# Patient Record
Sex: FEMALE | Race: WHITE | NOT HISPANIC OR LATINO | URBAN - METROPOLITAN AREA
[De-identification: names, ages, dates, MRNs, and addresses within clinical notes are randomized per-mention and may not be internally consistent; named-entity substitution may affect disease eponyms.]

---

## 2023-08-23 ENCOUNTER — INPATIENT (INPATIENT)
Facility: HOSPITAL | Age: 23
LOS: 1 days | Discharge: HOME | DRG: 645 | End: 2023-08-25
Attending: SPECIALIST | Admitting: SPECIALIST
Payer: COMMERCIAL

## 2023-08-23 VITALS
SYSTOLIC BLOOD PRESSURE: 123 MMHG | HEIGHT: 62 IN | RESPIRATION RATE: 18 BRPM | DIASTOLIC BLOOD PRESSURE: 79 MMHG | WEIGHT: 125 LBS | TEMPERATURE: 98 F | HEART RATE: 105 BPM | OXYGEN SATURATION: 95 %

## 2023-08-23 LAB
ANION GAP SERPL CALC-SCNC: 9 MMOL/L — SIGNIFICANT CHANGE UP (ref 5–17)
BASOPHILS # BLD AUTO: 0.06 K/UL — SIGNIFICANT CHANGE UP (ref 0–0.2)
BASOPHILS NFR BLD AUTO: 0.6 % — SIGNIFICANT CHANGE UP (ref 0–2)
BUN SERPL-MCNC: 5 MG/DL — LOW (ref 7–23)
CALCIUM SERPL-MCNC: 10 MG/DL — SIGNIFICANT CHANGE UP (ref 8.4–10.5)
CHLORIDE SERPL-SCNC: 101 MMOL/L — SIGNIFICANT CHANGE UP (ref 96–108)
CO2 SERPL-SCNC: 29 MMOL/L — SIGNIFICANT CHANGE UP (ref 22–31)
CREAT SERPL-MCNC: 0.61 MG/DL — SIGNIFICANT CHANGE UP (ref 0.5–1.3)
EGFR: 131 ML/MIN/1.73M2 — SIGNIFICANT CHANGE UP
EOSINOPHIL # BLD AUTO: 0.09 K/UL — SIGNIFICANT CHANGE UP (ref 0–0.5)
EOSINOPHIL NFR BLD AUTO: 1 % — SIGNIFICANT CHANGE UP (ref 0–6)
GLUCOSE SERPL-MCNC: 103 MG/DL — HIGH (ref 70–99)
HCG SERPL-ACNC: <0 MIU/ML — SIGNIFICANT CHANGE UP
HCT VFR BLD CALC: 36 % — SIGNIFICANT CHANGE UP (ref 34.5–45)
HGB BLD-MCNC: 11.8 G/DL — SIGNIFICANT CHANGE UP (ref 11.5–15.5)
IMM GRANULOCYTES NFR BLD AUTO: 0.3 % — SIGNIFICANT CHANGE UP (ref 0–0.9)
LYMPHOCYTES # BLD AUTO: 2.57 K/UL — SIGNIFICANT CHANGE UP (ref 1–3.3)
LYMPHOCYTES # BLD AUTO: 27.2 % — SIGNIFICANT CHANGE UP (ref 13–44)
MCHC RBC-ENTMCNC: 29 PG — SIGNIFICANT CHANGE UP (ref 27–34)
MCHC RBC-ENTMCNC: 32.8 GM/DL — SIGNIFICANT CHANGE UP (ref 32–36)
MCV RBC AUTO: 88.5 FL — SIGNIFICANT CHANGE UP (ref 80–100)
MONOCYTES # BLD AUTO: 0.52 K/UL — SIGNIFICANT CHANGE UP (ref 0–0.9)
MONOCYTES NFR BLD AUTO: 5.5 % — SIGNIFICANT CHANGE UP (ref 2–14)
NEUTROPHILS # BLD AUTO: 6.19 K/UL — SIGNIFICANT CHANGE UP (ref 1.8–7.4)
NEUTROPHILS NFR BLD AUTO: 65.4 % — SIGNIFICANT CHANGE UP (ref 43–77)
NRBC # BLD: 0 /100 WBCS — SIGNIFICANT CHANGE UP (ref 0–0)
PLATELET # BLD AUTO: 345 K/UL — SIGNIFICANT CHANGE UP (ref 150–400)
POTASSIUM SERPL-MCNC: 3.8 MMOL/L — SIGNIFICANT CHANGE UP (ref 3.5–5.3)
POTASSIUM SERPL-SCNC: 3.8 MMOL/L — SIGNIFICANT CHANGE UP (ref 3.5–5.3)
RBC # BLD: 4.07 M/UL — SIGNIFICANT CHANGE UP (ref 3.8–5.2)
RBC # FLD: 12.5 % — SIGNIFICANT CHANGE UP (ref 10.3–14.5)
SODIUM SERPL-SCNC: 139 MMOL/L — SIGNIFICANT CHANGE UP (ref 135–145)
WBC # BLD: 9.46 K/UL — SIGNIFICANT CHANGE UP (ref 3.8–10.5)
WBC # FLD AUTO: 9.46 K/UL — SIGNIFICANT CHANGE UP (ref 3.8–10.5)

## 2023-08-23 PROCEDURE — 99285 EMERGENCY DEPT VISIT HI MDM: CPT

## 2023-08-23 PROCEDURE — 70491 CT SOFT TISSUE NECK W/DYE: CPT | Mod: 26,MA

## 2023-08-23 RX ORDER — ONDANSETRON 8 MG/1
4 TABLET, FILM COATED ORAL EVERY 4 HOURS
Refills: 0 | Status: DISCONTINUED | OUTPATIENT
Start: 2023-08-23 | End: 2023-08-25

## 2023-08-23 RX ORDER — AMPICILLIN SODIUM AND SULBACTAM SODIUM 250; 125 MG/ML; MG/ML
INJECTION, POWDER, FOR SUSPENSION INTRAMUSCULAR; INTRAVENOUS
Refills: 0 | Status: DISCONTINUED | OUTPATIENT
Start: 2023-08-23 | End: 2023-08-25

## 2023-08-23 RX ORDER — KETOROLAC TROMETHAMINE 30 MG/ML
15 SYRINGE (ML) INJECTION ONCE
Refills: 0 | Status: DISCONTINUED | OUTPATIENT
Start: 2023-08-23 | End: 2023-08-23

## 2023-08-23 RX ORDER — SERTRALINE 25 MG/1
125 TABLET, FILM COATED ORAL DAILY
Refills: 0 | Status: DISCONTINUED | OUTPATIENT
Start: 2023-08-23 | End: 2023-08-25

## 2023-08-23 RX ORDER — SODIUM CHLORIDE 9 MG/ML
1000 INJECTION, SOLUTION INTRAVENOUS
Refills: 0 | Status: DISCONTINUED | OUTPATIENT
Start: 2023-08-24 | End: 2023-08-24

## 2023-08-23 RX ORDER — AMPICILLIN SODIUM AND SULBACTAM SODIUM 250; 125 MG/ML; MG/ML
3 INJECTION, POWDER, FOR SUSPENSION INTRAMUSCULAR; INTRAVENOUS ONCE
Refills: 0 | Status: COMPLETED | OUTPATIENT
Start: 2023-08-23 | End: 2023-08-23

## 2023-08-23 RX ORDER — OXYCODONE HYDROCHLORIDE 5 MG/1
5 TABLET ORAL EVERY 4 HOURS
Refills: 0 | Status: DISCONTINUED | OUTPATIENT
Start: 2023-08-23 | End: 2023-08-25

## 2023-08-23 RX ORDER — IBUPROFEN 200 MG
400 TABLET ORAL EVERY 6 HOURS
Refills: 0 | Status: DISCONTINUED | OUTPATIENT
Start: 2023-08-23 | End: 2023-08-25

## 2023-08-23 RX ORDER — AMPICILLIN SODIUM AND SULBACTAM SODIUM 250; 125 MG/ML; MG/ML
3 INJECTION, POWDER, FOR SUSPENSION INTRAMUSCULAR; INTRAVENOUS EVERY 6 HOURS
Refills: 0 | Status: DISCONTINUED | OUTPATIENT
Start: 2023-08-24 | End: 2023-08-25

## 2023-08-23 RX ORDER — PANTOPRAZOLE SODIUM 20 MG/1
40 TABLET, DELAYED RELEASE ORAL DAILY
Refills: 0 | Status: DISCONTINUED | OUTPATIENT
Start: 2023-08-23 | End: 2023-08-25

## 2023-08-23 RX ORDER — ACETAMINOPHEN 500 MG
650 TABLET ORAL EVERY 6 HOURS
Refills: 0 | Status: DISCONTINUED | OUTPATIENT
Start: 2023-08-23 | End: 2023-08-25

## 2023-08-23 RX ORDER — LANOLIN ALCOHOL/MO/W.PET/CERES
3 CREAM (GRAM) TOPICAL AT BEDTIME
Refills: 0 | Status: DISCONTINUED | OUTPATIENT
Start: 2023-08-23 | End: 2023-08-25

## 2023-08-23 RX ORDER — SODIUM CHLORIDE 9 MG/ML
1000 INJECTION INTRAMUSCULAR; INTRAVENOUS; SUBCUTANEOUS ONCE
Refills: 0 | Status: COMPLETED | OUTPATIENT
Start: 2023-08-23 | End: 2023-08-23

## 2023-08-23 RX ORDER — HEPARIN SODIUM 5000 [USP'U]/ML
5000 INJECTION INTRAVENOUS; SUBCUTANEOUS ONCE
Refills: 0 | Status: COMPLETED | OUTPATIENT
Start: 2023-08-23 | End: 2023-08-23

## 2023-08-23 RX ADMIN — HEPARIN SODIUM 5000 UNIT(S): 5000 INJECTION INTRAVENOUS; SUBCUTANEOUS at 20:57

## 2023-08-23 RX ADMIN — Medication 15 MILLIGRAM(S): at 15:57

## 2023-08-23 RX ADMIN — Medication 400 MILLIGRAM(S): at 21:58

## 2023-08-23 RX ADMIN — SODIUM CHLORIDE 1000 MILLILITER(S): 9 INJECTION INTRAMUSCULAR; INTRAVENOUS; SUBCUTANEOUS at 18:56

## 2023-08-23 RX ADMIN — Medication 15 MILLIGRAM(S): at 18:53

## 2023-08-23 RX ADMIN — AMPICILLIN SODIUM AND SULBACTAM SODIUM 200 GRAM(S): 250; 125 INJECTION, POWDER, FOR SUSPENSION INTRAMUSCULAR; INTRAVENOUS at 20:59

## 2023-08-23 RX ADMIN — SODIUM CHLORIDE 1000 MILLILITER(S): 9 INJECTION INTRAMUSCULAR; INTRAVENOUS; SUBCUTANEOUS at 15:57

## 2023-08-23 RX ADMIN — SERTRALINE 125 MILLIGRAM(S): 25 TABLET, FILM COATED ORAL at 21:54

## 2023-08-23 RX ADMIN — Medication 400 MILLIGRAM(S): at 20:58

## 2023-08-23 NOTE — ED PROVIDER NOTE - TOBACCO USE
Scheduled pre op on 2/18/2020    ----- Message from Claudia Akbar sent at 2/14/2020 12:10 PM CST -----  Contact: Chari   Chari would like a call back about a pre op visit. She said the nurse schedules this for her.      Unknown if ever smoked

## 2023-08-23 NOTE — ED ADULT TRIAGE NOTE - CHIEF COMPLAINT QUOTE
Pt presents to ED c/o throat pain x 4 days. Sent in ENT for IV antibiotics. Pain worse with swallowing.

## 2023-08-23 NOTE — CHART NOTE - NSCHARTNOTEFT_GEN_A_CORE
CT neck with contrast reviewed with evidence of abscess versus persistent thyroglossal duct cyst that in conjunction with clinical presentation indicates infection.     Plan:  - Admit to Dr. Bland  - ID consult  - IR consult for possible US guided needle aspiration  - Pain control  - Clear liquid diet for now then NPO midnight  - SCDs  - Incentive spirometer  - Heparin subQ x1 dose

## 2023-08-23 NOTE — H&P ADULT - NSHPPHYSICALEXAM_GEN_ALL_CORE
General: NAD, A+Ox3  Respiratory: No respiratory distress, stridor, or stertor  Voice quality: normal  Face:  Symmetric, facial movements intact.   OU: EOMI  AD: Pinna wnl  AS: Pinna wnl  Nose: nasal cavity clear bilaterally, inferior turbinates normal, mucosa normal without crusting or bleeding  OC/OP: tongue normal, floor of mouth WNL, no masses or lesions, OP clear  Neck: Small ~1.5 cm area of erythema with tenderness to submental region just above the midline of hyoid on the right side. No significant induration, mild swelling.   Neuro: CNII-XII intact

## 2023-08-23 NOTE — ED PROVIDER NOTE - OBJECTIVE STATEMENT
21 yo f with pmh of a thyroigloassal duct cyst removed in Feb c/b multiple infections c/o R sided sore throat/neck pain x 4 days. Pt started augmentin 3 days ago. Last night pain was worse so she went to the ED at [place] and was give a dose of IV abx. Pt followed up today with Dr. Jefferson and was advised to come to the ED. Pt states she is having a lot of pain with swallowing. Denies fever, chills, drooling, sob. 23 yo f with pmh of a thyroglossal duct cyst removed in Feb c/b multiple infections c/o R sided sore throat/neck pain x 4 days. Pt started augmentin 3 days ago. Last night pain was worse so she went to the ED at [place] and was give a dose of IV abx. Pt followed up today with Dr. Jefferson and was advised to come to the ED. Pt states she is having a lot of pain with swallowing. Denies fever, chills, drooling, sob.

## 2023-08-23 NOTE — H&P ADULT - ASSESSMENT
LUCIANO TAYLOR is a 22y Female with prior TGD excision in February 2023, with 2-3 episodes of recurrent symptoms including swelling and tenderness under chin that resolve with antibiotics, who was referred for evaluation from ENT office today after presenting with 3 days of swelling and tenderness to submental region in area of prior cyst excison. No significant swelling appreciated although some fullness/induration is present. Patient refusing scope exam due to discomfort with prior exams; understands risks of doing so including possible failure to diagnose pathology accurately and rule out any infection impacting airway. Tolerating diet; no voice changes or difficulty breathing. Afebrile with no leukocytosis.     - Admit to Dr. Bland  - Continuous pulse oximetry  - ID consult  - IR consult for possible US guided needle aspiration  - Pain control  - Clear liquid diet for now then NPO midnight  - SCDs  - Incentive spirometer  - Heparin subQ x1 dose LUCIANO TAYLOR is a 22y Female with prior TGD excision in February 2023, with 2-3 episodes of recurrent symptoms including swelling and tenderness under chin that resolve with antibiotics, who was referred for evaluation from ENT office today after presenting with 3 days of swelling and tenderness to submental region in area of prior cyst excison. No significant swelling appreciated although some fullness/induration is present. Patient refusing scope exam due to discomfort with prior exams; understands risks of doing so including possible failure to diagnose pathology accurately and rule out any infection impacting airway. Tolerating diet; no voice changes or difficulty breathing. Afebrile with no leukocytosis. CT scan demonstrating persistent thyroglossal duct cyst vs submental abscess that is <1cm.     - Admit to regional under Dr. Bland  - Continuous pulse oximetry  - Unasyn for now  - ID consult  - IR consult for possible US guided needle aspiration  - Pain control  - Clear liquid diet for now then NPO/IVF at midnight  - SCDs  - Incentive spirometer  - Heparin subQ x1 dose    Discussed with attending, Dr. Bland

## 2023-08-23 NOTE — ED PROVIDER NOTE - CLINICAL SUMMARY MEDICAL DECISION MAKING FREE TEXT BOX
21 yo f with pmh of a thyroigloassal duct cyst removed in Feb c/b multiple infections c/o R sided sore throat/neck pain x 4 days. Pt started augmentin 3 days ago. Last night pain was worse so she went to the ED at [place] and was give a dose of IV abx. Pt followed up today with Dr. Jefferson and was advised to come to the ED. Pt states she is having a lot of pain with swallowing. Denies fever, chills, drooling, sob. + circular indurated R sided submandibular swelling, erythema and tenderness 23 yo f with pmh of a thyroglossal duct cyst removed in Feb c/b multiple infections c/o R sided sore throat/neck pain x 4 days. Pt started augmentin 3 days ago. Last night pain was worse so she went to the ED at [place] and was give a dose of IV abx. Pt followed up today with Dr. Jefferson and was advised to come to the ED. Pt states she is having a lot of pain with swallowing. Denies fever, chills, drooling, sob. + circular indurated R sided submandibular swelling, erythema and tenderness 23 yo f with pmh of a thyroglossal duct cyst removed in Feb c/b multiple infections c/o R sided sore throat/neck pain x 4 days. Pt started augmentin 3 days ago. Last night pain was worse so she went to the ED at [place] and was give a dose of IV abx. Pt followed up today with Dr. Jefferson and was advised to come to the ED. Pt states she is having a lot of pain with swallowing. Denies fever, chills, drooling, sob. + circular indurated R sided submandibular swelling, erythema and tenderness. labs, ct neck, ent eval

## 2023-08-23 NOTE — ED ADULT NURSE NOTE - NSFALLUNIVINTERV_ED_ALL_ED
Bed/Stretcher in lowest position, wheels locked, appropriate side rails in place/Call bell, personal items and telephone in reach/Instruct patient to call for assistance before getting out of bed/chair/stretcher/Non-slip footwear applied when patient is off stretcher/[place] to call system/Physically safe environment - no spills, clutter or unnecessary equipment/Purposeful proactive rounding/Room/bathroom lighting operational, light cord in reach

## 2023-08-23 NOTE — ED PROVIDER NOTE - PROGRESS NOTE DETAILS
CT shows ?seroma vs infected seroma vs thyroductal cyst infection. ENT recommending admission for potential IR drainage tomorrow

## 2023-08-23 NOTE — ED PROVIDER NOTE - ATTENDING APP SHARED VISIT CONTRIBUTION OF CARE
Mild tachycardia, other vitals wnl. Exam as above.   Labs grossly wnl.   CT showed "Presumed phlegmonous changes in the right submental region as described above with small pocket of fluid superficial to the infrahyoid strap muscle. Question of seroma versus infected seroma or residual thyroglossal duct cyst."  ENT referred pt to ED --> ENT consulted, will admit for further care.   Given IVF, toradol.

## 2023-08-23 NOTE — ED ADULT NURSE NOTE - OBJECTIVE STATEMENT
pt received into spot B A&Ox4 ambulatory appears comfortable arrives via walk in triage for eval of sore throat pain with swallowing x 4 days. Pt speaks clear full sentences tolerates secretions has hx of similar issue throat infections. Went to other ER had dose of IV abx last night still having pain with swallowing went to MD's office who reffered to ED today. Pt respirations unlabored abd nondistended no distress noted. IV placed labs sent medicated per orders pendign ENT eval in nad family at bedside

## 2023-08-23 NOTE — H&P ADULT - HISTORY OF PRESENT ILLNESS
LUCIANO TAYLOR  is a 22y Female with history of prior thyroglossal duct cyst excision by ENT at [facility] 02/23 who presents today from ENT office for evaluation of swelling and tenderness under her chin that developed 3 days ago as well as discomfort with swallowing. Patient was started on Augmentin for possible infected cyst 3 days ago by PCP. Reports that she feels as if  the swelling and tenderness that developed under the chin is about the same since starting antibioitics. Has been taking tylenol and ibuprofen at home with intermittent improvement in pain but continues to recur. Has been tolerating diet including solids and liquids with some discomfort. No choking or coughing with eating. Denies any voice change or difficulty breathing. Reports this is the third episode of similar symptoms since the surgery to remove the cyst and she had an appointment with Dr. Bland today for a second opinion/reevaluation.

## 2023-08-23 NOTE — CONSULT NOTE ADULT - SUBJECTIVE AND OBJECTIVE BOX
OTOLARYNGOLOGY (ENT) CONSULTATION NOTE    PATIENT: LUCIANO TAYLOR     MRN: [id number]       : [birth year]  DATE OF ADMISSION:23  DATE OF SERVICE:  23 @ 16:29    CHIEF COMPLAINT: neck swelling for 3 days    HISTORY OF PRESENT ILLNESS:  LUCIANO TAYLOR  is a 22y Female with history of prior thyroglossal duct cyst excision by ENT at [facility]  who presents today from ENT office for evaluation of swelling and tenderness under her chin that developed 3 days ago as well as discomfort with swallowing. Patient was started on Augmentin for possible infected cyst 3 days ago by PCP. Reports that she feels as if  the swelling and tenderness that developed under the chin is about the same since starting antibioitics. Has been taking tylenol and ibuprofen at home with intermittent improvement in pain but continues to recur. Has been tolerating diet including solids and liquids with some discomfort. No choking or coughing with eating. Denies any voice change or difficulty breathing. Reports this is the third episode of similar symptoms since the surgery to remove the cyst and she had an appointment with Dr. Bland today for a second opinion/reevaluation.     PAST MEDICAL HISTORY:    CURRENT MEDICATIONS       HOME MEDICATIONS:      ALLERGIES:  No Known Allergies    SOCIAL HISTORY: Pertinent included in HPI   FAMILY HISTORY      SURGICAL HISTORY:      REVIEW OF SYSTEMS: The patient was asked and responded to a review of systems regarding the following symptoms: constitutional, eye, ears, nose, mouth, throat, cardiovascular, respiratory. Pertinent factors have been included in the HPI.     PHYSICAL EXAMINATION:  General: NAD, A+Ox3  Respiratory: No respiratory distress, stridor, or stertor  Voice quality: normal  Face:  Symmetric.   OU: EOMI  AD: Pinna wnl  AS: Pinna wnl  Nose: nasal cavity clear bilaterally, inferior turbinates normal, mucosa normal without crusting or bleeding  OC/OP: tongue normal, floor of mouth WNL, no masses or lesions, OP clear  Neck: soft/flat, no LAD  Neuro: CNII-XII intact    Vital Signs:  T(C): 36.9 (23 @ 15:23), Max: 36.9 (23 @ 15:23)  HR: 105 (23 @ 15:23) (105 - 105)  BP: 123/79 (23 @ 15:23) (123/79 - 123/)  RR: 18 (23 @ 15:23) (18 - 18)  SpO2: 95% (23 @ 15:23) (95% - 95%)                        11.8   9.46  )-----------( 345      ( 23 Aug 2023 16:04 )             36.0        139  |  101  |  x   ----------------------------<  103<H>  3.8   |  29  |  0.61    Ca    10.0      23 Aug 2023 16:04     [id number]    MICROBIOLOGY:      PATHOLOGY:                -------------------------------  ASSESSMENT/PLAN:    IMPRESSION: LUCIANO TAYLOR  is a 22y Female with     RECOMMENDATIONS:    ---  Thank you for the kind referral and for allowing me to share in the care of LUCIANO TAYLOR If you have any questions, please do not hesitate to contact me.     Sincerely,  Jenae Mckee PA-C  23 @ 16:29     OTOLARYNGOLOGY (ENT) CONSULTATION NOTE    PATIENT: LUCIANO TAYLOR     MRN: [id number]       : [birth year]  DATE OF ADMISSION:23  DATE OF SERVICE:  23 @ 16:29    CHIEF COMPLAINT: neck swelling for 3 days    HISTORY OF PRESENT ILLNESS:  LUCIANO TAYLOR  is a 22y Female with history of prior thyroglossal duct cyst excision by ENT at [facility]  who presents today from ENT office for evaluation of swelling and tenderness under her chin that developed 3 days ago as well as discomfort with swallowing. Patient was started on Augmentin for possible infected cyst 3 days ago by PCP. Reports that she feels as if  the swelling and tenderness that developed under the chin is about the same since starting antibioitics. Has been taking tylenol and ibuprofen at home with intermittent improvement in pain but continues to recur. Has been tolerating diet including solids and liquids with some discomfort. No choking or coughing with eating. Denies any voice change or difficulty breathing. Reports this is the third episode of similar symptoms since the surgery to remove the cyst and she had an appointment with Dr. Bland today for a second opinion/reevaluation.     PAST MEDICAL HISTORY:    CURRENT MEDICATIONS       HOME MEDICATIONS:      ALLERGIES:  No Known Allergies    SOCIAL HISTORY: Pertinent included in HPI   FAMILY HISTORY      SURGICAL HISTORY:      REVIEW OF SYSTEMS: The patient was asked and responded to a review of systems regarding the following symptoms: constitutional, eye, ears, nose, mouth, throat, cardiovascular, respiratory. Pertinent factors have been included in the HPI.     PHYSICAL EXAMINATION:  General: NAD, A+Ox3  Respiratory: No respiratory distress, stridor, or stertor  Voice quality: normal  Face:  Symmetric, facial movements intact.   OU: EOMI  AD: Pinna wnl  AS: Pinna wnl  Nose: nasal cavity clear bilaterally, inferior turbinates normal, mucosa normal without crusting or bleeding  OC/OP: tongue normal, floor of mouth WNL, no masses or lesions, OP clear  Neck: Small ~1.5 cm area of erythema with tenderness to submental region just above the midline of hyoid on the right side. No significant induration, mild swelling.   Neuro: CNII-XII intact    Vital Signs:  T(C): 36.9 (23 @ 15:23), Max: 36.9 (23 @ 15:23)  HR: 105 (23 @ 15:23) (105 - 105)  BP: 123/79 (23 @ 15:23) (123/79 - 123/79)  RR: 18 (23 @ 15:23) (18 - 18)  SpO2: 95% (23 @ 15:23) (95% - 95%)                        11.8   9.46  )-----------( 345      ( 23 Aug 2023 16:04 )             36.0        139  |  101  |  x   ----------------------------<  103<H>  3.8   |  29  |  0.61    Ca    10.0      23 Aug 2023 16:04     [id number]

## 2023-08-23 NOTE — ED PROVIDER NOTE - PHYSICAL EXAMINATION
CONSTITUTIONAL: Well-appearing;  in no apparent distress.   HEAD: Normocephalic; atraumatic.   EYES: PERRL; EOM intact; conjunctiva and sclera clear  ENT: normal nose; no rhinorrhea; normal pharynx with no erythema or lesions.   NECK: Supple; + circular indurated R sided submandibular swelling, erythema and tenderness   CARDIOVASCULAR: rrr,  RESPIRATORY: Breathing easily;   MSK: FROM at all extremities, normal tone   EXT: No cyanosis or edema; N/V intact  SKIN: Normal for age and race; warm; dry; good turgor; no apparent lesions or rash.

## 2023-08-24 LAB
APTT BLD: 35.3 SEC — SIGNIFICANT CHANGE UP (ref 24.5–35.6)
GRAM STN FLD: SIGNIFICANT CHANGE UP
HCG UR QL: NEGATIVE — SIGNIFICANT CHANGE UP
INR BLD: 1.05 — SIGNIFICANT CHANGE UP (ref 0.85–1.18)
PROTHROM AB SERPL-ACNC: 11.9 SEC — SIGNIFICANT CHANGE UP (ref 9.5–13)
SPECIMEN SOURCE: SIGNIFICANT CHANGE UP

## 2023-08-24 PROCEDURE — 10160 PNXR ASPIR ABSC HMTMA BULLA: CPT

## 2023-08-24 PROCEDURE — 76942 ECHO GUIDE FOR BIOPSY: CPT | Mod: 26

## 2023-08-24 PROCEDURE — 99222 1ST HOSP IP/OBS MODERATE 55: CPT

## 2023-08-24 RX ORDER — BENZOCAINE AND MENTHOL 5; 1 G/100ML; G/100ML
1 LIQUID ORAL
Refills: 0 | Status: DISCONTINUED | OUTPATIENT
Start: 2023-08-24 | End: 2023-08-25

## 2023-08-24 RX ORDER — ACETAMINOPHEN 500 MG
1000 TABLET ORAL ONCE
Refills: 0 | Status: COMPLETED | OUTPATIENT
Start: 2023-08-24 | End: 2023-08-24

## 2023-08-24 RX ORDER — ACETAMINOPHEN 500 MG
1000 TABLET ORAL ONCE
Refills: 0 | Status: DISCONTINUED | OUTPATIENT
Start: 2023-08-24 | End: 2023-08-25

## 2023-08-24 RX ADMIN — AMPICILLIN SODIUM AND SULBACTAM SODIUM 200 GRAM(S): 250; 125 INJECTION, POWDER, FOR SUSPENSION INTRAMUSCULAR; INTRAVENOUS at 16:38

## 2023-08-24 RX ADMIN — SODIUM CHLORIDE 75 MILLILITER(S): 9 INJECTION, SOLUTION INTRAVENOUS at 00:15

## 2023-08-24 RX ADMIN — AMPICILLIN SODIUM AND SULBACTAM SODIUM 200 GRAM(S): 250; 125 INJECTION, POWDER, FOR SUSPENSION INTRAMUSCULAR; INTRAVENOUS at 10:03

## 2023-08-24 RX ADMIN — AMPICILLIN SODIUM AND SULBACTAM SODIUM 200 GRAM(S): 250; 125 INJECTION, POWDER, FOR SUSPENSION INTRAMUSCULAR; INTRAVENOUS at 21:19

## 2023-08-24 RX ADMIN — Medication 400 MILLIGRAM(S): at 04:15

## 2023-08-24 RX ADMIN — AMPICILLIN SODIUM AND SULBACTAM SODIUM 200 GRAM(S): 250; 125 INJECTION, POWDER, FOR SUSPENSION INTRAMUSCULAR; INTRAVENOUS at 03:01

## 2023-08-24 RX ADMIN — Medication 400 MILLIGRAM(S): at 03:15

## 2023-08-24 RX ADMIN — SERTRALINE 125 MILLIGRAM(S): 25 TABLET, FILM COATED ORAL at 21:38

## 2023-08-24 RX ADMIN — Medication 650 MILLIGRAM(S): at 16:39

## 2023-08-24 NOTE — CONSULT NOTE ADULT - ASSESSMENT
#Thyroglossal duct cyst    Amanda Garcia is a 21 yo woman with PMHx of thyroglossal duct cyst excision (02/23) who presents with thyroglossal duct vs seroma infection. Her symptoms did not improve on oral Augmentin but are now improving while inpatient on IV Unasyn. Afebrile on admission without leukocytosis. CT consistent with infected seroma vs. residual thyroglossal duct cyst. She is now awaiting IR consultation for drainage potential.       Recommendations:  - Continue Unasyn IV 3g q6hr  - If drainable by IR, send cultures      ID Team 1 will continue to follow
ASSESSMENT/PLAN:    IMPRESSION: LUCIANO TAYLOR is a 22y Female with prior TGD excision in February 2023, with 2-3 episodes of recurrent symptoms including swelling and tenderness under chin that resolve with antibiotics, who was referred for evaluation from ENT office today after presenting with 3 days of swelling and tenderness to submental region in area of prior cyst excison. No significant swelling appreciated although some fullness/induration is present. Patient refusing scope exam due to discomfort with prior exams; understands risks of doing so including possible failure to diagnose pathology accurately and rule out any infection impacting airway. Tolerating diet; no voice changes or difficulty breathing. Afebrile with no leukocytosis.     RECOMMENDATIONS:  - CT neck with IV contrast  - Consider changing antibiotics to clindamycin given lack of improvement on Augmentin  - Follow up with Dr. Bland after resolution of acute infection    Thank you for the kind referral and for allowing me to share in the care of LUCIANO TAYLOR If you have any questions, please do not hesitate to contact me.     Sincerely,  Joselito Godinez MD PGY2  08-23-23 @ 16:29
Assessment: 22F with history of prior thyroglossal duct cyst excision by ENT at [facility] 02/23 who presents from ENT office for evaluation of swelling and tenderness under her chin x 3 days as well as discomfort with swallowing. Reports this is the third episode of similar symptoms since the surgery to remove the cyst. ENT following for possible seroma vs abscess vs cyst remnant. IR consulted for aspiration of collection. Case reviewed with Dr. Rosado, plan for procedure with local anesthesia.     Communicated with: Jenae SPENCER

## 2023-08-24 NOTE — CONSULT NOTE ADULT - SUBJECTIVE AND OBJECTIVE BOX
22F with history of prior thyroglossal duct cyst excision by ENT at [facility] 02/23 who presents today from ENT office for evaluation of swelling and tenderness under her chin that developed 3 days ago as well as discomfort with swallowing. Patient was started on Augmentin for possible infected cyst 3 days ago by PCP. Reports that she feels as if  the swelling and tenderness that developed under the chin is about the same since starting antibioitics. Has been taking tylenol and ibuprofen at home with intermittent improvement in pain but continues to recur. Has been tolerating diet including solids and liquids with some discomfort. No choking or coughing with eating. Denies any voice change or difficulty breathing. Reports this is the third episode of similar symptoms since the surgery to remove the cyst and she had an appointment with Dr. Bland today for a second opinion/reevaluation.     infected remnant TGDC. Some improvement with Abx. Will require US guided aspiration of fluid collection for culture data. Discussed with the patient and mother at bedside the need to treat the acute episode of aspiration and a course of abx prior to considering operative re-resection of the remnant cyst in the near future. The earliest she can possibly be discharged is tomorrow if we have culture data.  22F with history of prior thyroglossal duct cyst excision by ENT at [facility] 02/23 who presents from ENT office for evaluation of swelling and tenderness under her chin x 3 days as well as discomfort with swallowing. Patient was started on Augmentin for possible infected cyst 3 days ago by PCP. Reports that she feels as if  the swelling and tenderness that developed under the chin is about the same since starting antibiotics. Has been taking tylenol and ibuprofen at home with intermittent improvement in pain but continues to recur. Has been tolerating diet including solids and liquids with some discomfort. No choking or coughing with eating. Denies any voice change or difficulty breathing. Reports this is the third episode of similar symptoms since the surgery to remove the cyst and she had an appointment with Dr. Bland for a second opinion/reevaluation. ENT following for possible seroma vs abscess vs cyst remnant. IR consulted for aspiration of collection.       Clinical History: NECK PAIN    Handoff    MEWS Score    Throat pain    NECK PAIN    [email or web address]        Meds:acetaminophen     Tablet .. 650 milliGRAM(s) Oral every 6 hours PRN  ampicillin/sulbactam  IVPB 3 Gram(s) IV Intermittent every 6 hours  ampicillin/sulbactam  IVPB      dextrose 5% + sodium chloride 0.45%. 1000 milliLiter(s) IV Continuous <Continuous>  ibuprofen  Tablet. 400 milliGRAM(s) Oral every 6 hours PRN  melatonin 3 milliGRAM(s) Oral at bedtime PRN  ondansetron Injectable 4 milliGRAM(s) IV Push every 4 hours PRN  oxyCODONE    IR 5 milliGRAM(s) Oral every 4 hours PRN  pantoprazole   Suspension 40 milliGRAM(s) Oral daily PRN  sertraline 125 milliGRAM(s) Oral daily      Allergies:No Known Allergies        Labs:                           11.8   9.46  )-----------( 345      ( 23 Aug 2023 16:04 )             36.0     PT/INR - ( 24 Aug 2023 11:53 )   PT: 11.9 sec;   INR: 1.05          PTT - ( 24 Aug 2023 11:53 )  PTT:35.3 sec  08-23    139  |  101  |  5<L>  ----------------------------<  103<H>  3.8   |  29  |  0.61    Ca    10.0      23 Aug 2023 16:04            Imaging Findings:  Presumed phlegmonous changes in the right submental region as described above with small pocket of fluid superficial to the infrahyoid strap muscle. Question of seroma versus infected seroma or residual thyroglossal duct cyst.

## 2023-08-24 NOTE — PROGRESS NOTE ADULT - ENMT COMMENTS
Submental induration much improved. There is deep fluctuance just right of midline in the submental area. No overlying erythema. Neck ROM is intact in all directions.

## 2023-08-24 NOTE — PROGRESS NOTE ADULT - SUBJECTIVE AND OBJECTIVE BOX
OTOLARYNGOLOGY (ENT) PROGRESS NOTE    PATIENT: LUCIANO TAYLOR  MRN: [id number]  : [birth year]  [id number]  DATE OF SERVICE:  23  			           Subjective/ Interval:   ; patient seen this morning doing well, pending ID consult and IR recommendations. Afebrile     ALLERGIES:  No Known Allergies      MEDICATIONS:  Antiinfectives:   ampicillin/sulbactam  IVPB 3 Gram(s) IV Intermittent every 6 hours  ampicillin/sulbactam  IVPB        IV fluids:  dextrose 5% + sodium chloride 0.45%. 1000 milliLiter(s) IV Continuous <Continuous>    Hematologic/Anticoagulation:    Pain medications/Neuro:  acetaminophen     Tablet .. 650 milliGRAM(s) Oral every 6 hours PRN  ibuprofen  Tablet. 400 milliGRAM(s) Oral every 6 hours PRN  melatonin 3 milliGRAM(s) Oral at bedtime PRN  ondansetron Injectable 4 milliGRAM(s) IV Push every 4 hours PRN  oxyCODONE    IR 5 milliGRAM(s) Oral every 4 hours PRN  sertraline 125 milliGRAM(s) Oral daily    Endocrine Medications:     All other standing medications:     All other PRN medications:  pantoprazole   Suspension 40 milliGRAM(s) Oral daily PRN    Vital Signs Last 24 Hrs  T(C): 36.6 (24 Aug 2023 05:14), Max: 36.9 (23 Aug 2023 15:23)  T(F): 97.8 (24 Aug 2023 05:14), Max: 98.5 (23 Aug 2023 15:23)  HR: 76 (24 Aug 2023 05:14) (76 - 105)  BP: 111/71 (24 Aug 2023 05:14) (111/67 - 123/79)  BP(mean): --  RR: 17 (24 Aug 2023 05:14) (17 - 18)  SpO2: 100% (24 Aug 2023 05:14) (95% - 100%)    Parameters below as of 24 Aug 2023 05:14  Patient On (Oxygen Delivery Method): room air           @ 07:01  -   @ 07:00  --------------------------------------------------------  IN:    dextrose 5% + sodium chloride 0.45%: 558 mL    IV PiggyBack: 200 mL  Total IN: 758 mL    OUT:    Voided (mL): 400 mL  Total OUT: 400 mL    Total NET: 358 mL        PHYSICAL EXAM:  GEN: appears stated age, NAD  Respiratory: No respiratory distress, stridor, or stertor  Voice quality: normal  Face:  Symmetric, facial movements intact.   OU: EOMI  AD: Pinna wnl  AS: Pinna wnl  Nose: nasal cavity clear bilaterally, inferior turbinates normal, mucosa normal without crusting or bleeding  OC/OP: tongue normal, floor of mouth WNL, no masses or lesions, OP clear  Neck: Small ~1.5 cm area of erythema with tenderness to submental region just above the midline of hyoid on the right side. No significant induration, mild swelling.   Neuro: CNII-XII intact      LABS                       11.8   9.46  )-----------( 345      ( 23 Aug 2023 16:04 )             36.0        139  |  101  |  5<L>  ----------------------------<  103<H>  3.8   |  29  |  0.61    Ca    10.0      23 Aug 2023 16:04         Urinalysis Basic - ( 23 Aug 2023 16:04 )    Color: x / Appearance: x / SG: x / pH: x  Gluc: 103 mg/dL / Ketone: x  / Bili: x / Urobili: x   Blood: x / Protein: x / Nitrite: x   Leuk Esterase: x / RBC: x / WBC x   Sq Epi: x / Non Sq Epi: x / Bacteria: x      Endocrine Panel-  Calcium: 10.0 mg/dL ( @ 16:04)      Assessment and Plan:  LUCIANO TAYLOR is a  22yFemale   .. S/P ...    PLAN:    Disposition:   [facility] at [phone] with any questions/concerns.    Jenae Mckee PA-C  23 @ 08:57 OTOLARYNGOLOGY (ENT) PROGRESS NOTE    PATIENT: LUCIANO TAYLOR  MRN: [id number]  : [birth year]  [id number]  DATE OF SERVICE:  23  			           Subjective/ Interval:   ; patient seen this morning doing well, pending ID consult and IR recommendations. Afebrile     ALLERGIES:  No Known Allergies      MEDICATIONS:  Antiinfectives:   ampicillin/sulbactam  IVPB 3 Gram(s) IV Intermittent every 6 hours  ampicillin/sulbactam  IVPB        IV fluids:  dextrose 5% + sodium chloride 0.45%. 1000 milliLiter(s) IV Continuous <Continuous>    Hematologic/Anticoagulation:    Pain medications/Neuro:  acetaminophen     Tablet .. 650 milliGRAM(s) Oral every 6 hours PRN  ibuprofen  Tablet. 400 milliGRAM(s) Oral every 6 hours PRN  melatonin 3 milliGRAM(s) Oral at bedtime PRN  ondansetron Injectable 4 milliGRAM(s) IV Push every 4 hours PRN  oxyCODONE    IR 5 milliGRAM(s) Oral every 4 hours PRN  sertraline 125 milliGRAM(s) Oral daily    Endocrine Medications:     All other standing medications:     All other PRN medications:  pantoprazole   Suspension 40 milliGRAM(s) Oral daily PRN    Vital Signs Last 24 Hrs  T(C): 36.6 (24 Aug 2023 05:14), Max: 36.9 (23 Aug 2023 15:23)  T(F): 97.8 (24 Aug 2023 05:14), Max: 98.5 (23 Aug 2023 15:23)  HR: 76 (24 Aug 2023 05:14) (76 - 105)  BP: 111/71 (24 Aug 2023 05:14) (111/67 - 123/79)  BP(mean): --  RR: 17 (24 Aug 2023 05:14) (17 - 18)  SpO2: 100% (24 Aug 2023 05:14) (95% - 100%)    Parameters below as of 24 Aug 2023 05:14  Patient On (Oxygen Delivery Method): room air           @ 07:01  -   @ 07:00  --------------------------------------------------------  IN:    dextrose 5% + sodium chloride 0.45%: 558 mL    IV PiggyBack: 200 mL  Total IN: 758 mL    OUT:    Voided (mL): 400 mL  Total OUT: 400 mL    Total NET: 358 mL        PHYSICAL EXAM:  GEN: appears stated age, NAD  Respiratory: No respiratory distress, stridor, or stertor  Voice quality: normal  Face:  Symmetric, facial movements intact.   OU: EOMI  AD: Pinna wnl  AS: Pinna wnl  Nose: nasal cavity clear bilaterally, inferior turbinates normal, mucosa normal without crusting or bleeding  OC/OP: tongue normal, floor of mouth WNL, no masses or lesions, OP clear  Neck: Small ~1.5 cm area of erythema with tenderness to submental region just above the midline of hyoid on the right side. No significant induration, mild swelling.   Neuro: CNII-XII intact      LABS                       11.8   9.46  )-----------( 345      ( 23 Aug 2023 16:04 )             36.0        139  |  101  |  5<L>  ----------------------------<  103<H>  3.8   |  29  |  0.61    Ca    10.0      23 Aug 2023 16:04         Urinalysis Basic - ( 23 Aug 2023 16:04 )    Color: x / Appearance: x / SG: x / pH: x  Gluc: 103 mg/dL / Ketone: x  / Bili: x / Urobili: x   Blood: x / Protein: x / Nitrite: x   Leuk Esterase: x / RBC: x / WBC x   Sq Epi: x / Non Sq Epi: x / Bacteria: x      Endocrine Panel-  Calcium: 10.0 mg/dL ( @ 16:04)

## 2023-08-24 NOTE — CONSULT NOTE ADULT - SUBJECTIVE AND OBJECTIVE BOX
**INCOMPLETE NOTE**    INFECTIOUS DISEASES INITIAL CONSULT NOTE    HPI:  LUCIANO TAYLOR  is a 22y Female with history of prior thyroglossal duct cyst excision by ENT at [facility] 02/23 who presents today from ENT office for evaluation of swelling and tenderness under her chin that developed 3 days ago as well as discomfort with swallowing. Patient was started on Augmentin for possible infected cyst 3 days ago by PCP. Reports that she feels as if  the swelling and tenderness that developed under the chin is about the same since starting antibioitics. Has been taking tylenol and ibuprofen at home with intermittent improvement in pain but continues to recur. Has been tolerating diet including solids and liquids with some discomfort. No choking or coughing with eating. Denies any voice change or difficulty breathing. Reports this is the third episode of similar symptoms since the surgery to remove the cyst and she had an appointment with Dr. Bland today for a second opinion/reevaluation.  (23 Aug 2023 19:18)      PAST MEDICAL & SURGICAL HISTORY:        Review of Systems:   Constitutional, eyes, ENT, cardiovascular, respiratory, gastrointestinal, genitourinary, integumentary, neurological, psychiatric and heme/lymph are otherwise negative other than noted above       ANTIBIOTICS:  MEDICATIONS  (STANDING):  ampicillin/sulbactam  IVPB 3 Gram(s) IV Intermittent every 6 hours  ampicillin/sulbactam  IVPB      dextrose 5% + sodium chloride 0.45%. 1000 milliLiter(s) (75 mL/Hr) IV Continuous <Continuous>  sertraline 125 milliGRAM(s) Oral daily    MEDICATIONS  (PRN):  acetaminophen     Tablet .. 650 milliGRAM(s) Oral every 6 hours PRN Mild Pain (1 - 3)  ibuprofen  Tablet. 400 milliGRAM(s) Oral every 6 hours PRN Moderate Pain (4 - 6)  melatonin 3 milliGRAM(s) Oral at bedtime PRN Sleep  ondansetron Injectable 4 milliGRAM(s) IV Push every 4 hours PRN Nausea and/or Vomiting  oxyCODONE    IR 5 milliGRAM(s) Oral every 4 hours PRN Severe Pain (7 - 10)  pantoprazole   Suspension 40 milliGRAM(s) Oral daily PRN heartburn      Allergies    No Known Allergies    Intolerances        SOCIAL HISTORY:    FAMILY HISTORY:   no FH leading to current infection    Vital Signs Last 24 Hrs  T(C): 36.6 (24 Aug 2023 05:14), Max: 36.9 (23 Aug 2023 15:23)  T(F): 97.8 (24 Aug 2023 05:14), Max: 98.5 (23 Aug 2023 15:23)  HR: 76 (24 Aug 2023 05:14) (76 - 105)  BP: 111/71 (24 Aug 2023 05:14) (111/67 - 123/79)  BP(mean): --  RR: 17 (24 Aug 2023 05:14) (17 - 18)  SpO2: 100% (24 Aug 2023 05:14) (95% - 100%)    Parameters below as of 24 Aug 2023 05:14  Patient On (Oxygen Delivery Method): room air        08-23-23 @ 07:01  -  08-24-23 @ 07:00  --------------------------------------------------------  IN: 758 mL / OUT: 400 mL / NET: 358 mL        PHYSICAL EXAM:  Constitutional: alert, NAD  Eyes: the sclera and conjunctiva were normal.   ENT: the ears and nose were normal in appearance.   Neck: the appearance of the neck was normal and the neck was supple.   Pulmonary: no respiratory distress and lungs were clear to auscultation bilaterally.   Heart: heart rate was normal and rhythm regular, normal S1 and S2  Vascular:. there was no peripheral edema  Abdomen: normal bowel sounds, soft, non-tender  Neurological: no focal deficits.   Psychiatric: the affect was normal      LABS:                        11.8   9.46  )-----------( 345      ( 23 Aug 2023 16:04 )             36.0     08-23    139  |  101  |  5<L>  ----------------------------<  103<H>  3.8   |  29  |  0.61    Ca    10.0      23 Aug 2023 16:04        Urinalysis Basic - ( 23 Aug 2023 16:04 )    Color: x / Appearance: x / SG: x / pH: x  Gluc: 103 mg/dL / Ketone: x  / Bili: x / Urobili: x   Blood: x / Protein: x / Nitrite: x   Leuk Esterase: x / RBC: x / WBC x   Sq Epi: x / Non Sq Epi: x / Bacteria: x        MICROBIOLOGY:    RADIOLOGY & ADDITIONAL STUDIES:   **INCOMPLETE NOTE**    INFECTIOUS DISEASES INITIAL CONSULT NOTE    HPI:  Amanda aGrcia is a 22 year old woman with history of prior thyroglossal duct cyst excision by ENT at [facility] 02/23 who presents from the ENT office for evaluation of swelling and tenderness under her chin that developed 3 days ago. She was started on Augmentin for possible infected cyst 3 days ago by PCP, which has not improved her symptoms. She has associated pain with swallowing but has been tolerating diet including solids and liquids. Denies any voice change, difficulty breathing, fevers/chills. Reports this is the third episode of similar symptoms since the surgery to remove the cyst. The most recent episode occurred in June and resolved with a 14 day course of Augmentin.    Review of Systems: No fevers/chills, weight loss, fatigue, difficulty breathing, choking, coughing, chest pain, abdominal pain, nausea, vomiting, or diarrhea      PAST MEDICAL & SURGICAL HISTORY:  Thyroglossal duct cyst s/p excision   Recurrent thyroglossal duct cyst infection  Breast cyst s/p excision    HOME MEDS: Zoloft, OCPs    Allergies: NKTD    SOCIAL HISTORY: Lives with parents in [place]; no nicotine, alcohol, recreational drug use      MEDICATIONS  (STANDING):  ampicillin/sulbactam  IVPB 3 Gram(s) IV Intermittent every 6 hours  ampicillin/sulbactam  IVPB      dextrose 5% + sodium chloride 0.45%. 1000 milliLiter(s) (75 mL/Hr) IV Continuous <Continuous>  sertraline 125 milliGRAM(s) Oral daily    MEDICATIONS  (PRN):  acetaminophen     Tablet .. 650 milliGRAM(s) Oral every 6 hours PRN Mild Pain (1 - 3)  ibuprofen  Tablet. 400 milliGRAM(s) Oral every 6 hours PRN Moderate Pain (4 - 6)  melatonin 3 milliGRAM(s) Oral at bedtime PRN Sleep  ondansetron Injectable 4 milliGRAM(s) IV Push every 4 hours PRN Nausea and/or Vomiting  oxyCODONE    IR 5 milliGRAM(s) Oral every 4 hours PRN Severe Pain (7 - 10)  pantoprazole   Suspension 40 milliGRAM(s) Oral daily PRN heartburn      Vital Signs Last 24 Hrs  T(C): 36.6 (24 Aug 2023 05:14), Max: 36.9 (23 Aug 2023 15:23)  T(F): 97.8 (24 Aug 2023 05:14), Max: 98.5 (23 Aug 2023 15:23)  HR: 76 (24 Aug 2023 05:14) (76 - 105)  BP: 111/71 (24 Aug 2023 05:14) (111/67 - 123/79)  BP(mean): --  RR: 17 (24 Aug 2023 05:14) (17 - 18)  SpO2: 100% (24 Aug 2023 05:14) (95% - 100%)    Parameters below as of 24 Aug 2023 05:14  Patient On (Oxygen Delivery Method): room air      08-23-23 @ 07:01  -  08-24-23 @ 07:00  --------------------------------------------------------  IN: 758 mL / OUT: 400 mL / NET: 358 mL      PHYSICAL EXAM:  Constitutional: alert, NAD  ENT: no oral lesions or masses  Neck: area of tenderness and fluctuance in the right submental area with small overlying erythema; No cervical lymphadenopathy or tenderness  Pulmonary: no respiratory distress, CTA bilaterally  Heart: rrr, normal S1 and S2  Abdomen: normal bowel sounds, soft, non-tender        LABS:                        11.8   9.46  )-----------( 345      ( 23 Aug 2023 16:04 )             36.0     08-23    139  |  101  |  5<L>  ----------------------------<  103<H>  3.8   |  29  |  0.61    Ca    10.0      23 Aug 2023 16:04        Urinalysis Basic - ( 23 Aug 2023 16:04 )    Color: x / Appearance: x / SG: x / pH: x  Gluc: 103 mg/dL / Ketone: x  / Bili: x / Urobili: x   Blood: x / Protein: x / Nitrite: x   Leuk Esterase: x / RBC: x / WBC x   Sq Epi: x / Non Sq Epi: x / Bacteria: x        RADIOLOGY & ADDITIONAL STUDIES:    8/23 CT findings: Presumed phlegmonous changes in the right submental region as described above with small pocket of fluid superficial to the infrahyoid strap muscle. Question of seroma versus infected seroma or residual thyroglossal duct cyst.     INFECTIOUS DISEASES INITIAL CONSULT NOTE    HPI:  Amanda Garcia is a 22 year old woman with history of prior thyroglossal duct cyst excision by ENT at [facility] 02/23 who presents from the ENT office for evaluation of swelling and tenderness under her chin that developed 3 days ago. She was started on Augmentin for possible infected cyst 3 days ago by PCP, which has not improved her symptoms. She has associated pain with swallowing but has been tolerating diet including solids and liquids. Denies any voice change, difficulty breathing, fevers/chills. Reports this is the third episode of similar symptoms since the surgery to remove the cyst. The most recent episode occurred in June and resolved with a 14 day course of Augmentin.    Review of Systems: No fevers/chills, weight loss, fatigue, difficulty breathing, choking, coughing, chest pain, abdominal pain, nausea, vomiting, or diarrhea      PAST MEDICAL & SURGICAL HISTORY:  Thyroglossal duct cyst s/p excision   Recurrent thyroglossal duct cyst infection  Breast cyst s/p excision    HOME MEDS: Zoloft, OCPs    Allergies: NKTD    SOCIAL HISTORY: Lives with parents in [place]; no nicotine, alcohol, recreational drug use      MEDICATIONS  (STANDING):  ampicillin/sulbactam  IVPB 3 Gram(s) IV Intermittent every 6 hours  ampicillin/sulbactam  IVPB      dextrose 5% + sodium chloride 0.45%. 1000 milliLiter(s) (75 mL/Hr) IV Continuous <Continuous>  sertraline 125 milliGRAM(s) Oral daily    MEDICATIONS  (PRN):  acetaminophen     Tablet .. 650 milliGRAM(s) Oral every 6 hours PRN Mild Pain (1 - 3)  ibuprofen  Tablet. 400 milliGRAM(s) Oral every 6 hours PRN Moderate Pain (4 - 6)  melatonin 3 milliGRAM(s) Oral at bedtime PRN Sleep  ondansetron Injectable 4 milliGRAM(s) IV Push every 4 hours PRN Nausea and/or Vomiting  oxyCODONE    IR 5 milliGRAM(s) Oral every 4 hours PRN Severe Pain (7 - 10)  pantoprazole   Suspension 40 milliGRAM(s) Oral daily PRN heartburn      Vital Signs Last 24 Hrs  T(C): 36.6 (24 Aug 2023 05:14), Max: 36.9 (23 Aug 2023 15:23)  T(F): 97.8 (24 Aug 2023 05:14), Max: 98.5 (23 Aug 2023 15:23)  HR: 76 (24 Aug 2023 05:14) (76 - 105)  BP: 111/71 (24 Aug 2023 05:14) (111/67 - 123/79)  BP(mean): --  RR: 17 (24 Aug 2023 05:14) (17 - 18)  SpO2: 100% (24 Aug 2023 05:14) (95% - 100%)    Parameters below as of 24 Aug 2023 05:14  Patient On (Oxygen Delivery Method): room air      08-23-23 @ 07:01  -  08-24-23 @ 07:00  --------------------------------------------------------  IN: 758 mL / OUT: 400 mL / NET: 358 mL      PHYSICAL EXAM:  Constitutional: alert, NAD  ENT: no oral lesions or masses  Neck: area of tenderness and fluctuance in the right submental area with small overlying erythema; No cervical lymphadenopathy or tenderness  Pulmonary: no respiratory distress, CTA bilaterally  Heart: rrr, normal S1 and S2  Abdomen: normal bowel sounds, soft, non-tender        LABS:                        11.8   9.46  )-----------( 345      ( 23 Aug 2023 16:04 )             36.0     08-23    139  |  101  |  5<L>  ----------------------------<  103<H>  3.8   |  29  |  0.61    Ca    10.0      23 Aug 2023 16:04        Urinalysis Basic - ( 23 Aug 2023 16:04 )    Color: x / Appearance: x / SG: x / pH: x  Gluc: 103 mg/dL / Ketone: x  / Bili: x / Urobili: x   Blood: x / Protein: x / Nitrite: x   Leuk Esterase: x / RBC: x / WBC x   Sq Epi: x / Non Sq Epi: x / Bacteria: x        RADIOLOGY & ADDITIONAL STUDIES:    8/23 CT findings: Presumed phlegmonous changes in the right submental region as described above with small pocket of fluid superficial to the infrahyoid strap muscle. Question of seroma versus infected seroma or residual thyroglossal duct cyst.

## 2023-08-24 NOTE — PROGRESS NOTE ADULT - SUBJECTIVE AND OBJECTIVE BOX
Admitted on 8/23 for infection of remnant thyroglossal duct cyst (hx of sistrunk 6 months ago). Started on Unasyn.     Doing better this morning. Feels swelling is improved. Swallowing easier.

## 2023-08-24 NOTE — CONSULT NOTE ADULT - ATTENDING COMMENTS
22F h/o thyroglossal duct cyst s/p excision in 2/2023 p/w erythema, swelling and pain x 3 days at the prior thyroglossal duct cyst area. She was diagnosed to have thyroglossal duct cyst last year and had recurrent infection. Thus she underwent excision by ENT at [facility] in 2/2023. She had recurrent infection in the Spring, July - treated with Augmentin x 2 weeks course. About 3 days PTA she started to have erythema/swelling/pain so she was started on Augmentin without improvement. She saw Dr. Bland on 8/23 for 2nd opinion, who told her to got to the ED for treatment. Upon admission, afebrile, VSS, WBC normal, Cr 0.61. She was started on IV Unasyn with improvement.  CT neck showed  presumed phlegmonous changes in the right submental region as   described above with small pocket of fluid superficial to the infrahyoid strap muscle. Question of seroma versus infected seroma or residual thyroglossal duct cyst. She underwent IR guided aspiration of the fluid collection.  Suspect infected seroma. Cont unasyn 3g IV q6h.  f/u culture.      Team 1 will follow you.  Case d/w primary team.    Tammy Victor MD, MS  Infectious Disease attending  work cell [phone]   For any questions during evening/weekend/holiday, please page ID on call

## 2023-08-25 ENCOUNTER — TRANSCRIPTION ENCOUNTER (OUTPATIENT)
Age: 23
End: 2023-08-25

## 2023-08-25 VITALS
SYSTOLIC BLOOD PRESSURE: 106 MMHG | RESPIRATION RATE: 16 BRPM | HEART RATE: 62 BPM | TEMPERATURE: 98 F | OXYGEN SATURATION: 100 % | DIASTOLIC BLOOD PRESSURE: 70 MMHG

## 2023-08-25 LAB
ANION GAP SERPL CALC-SCNC: 7 MMOL/L — SIGNIFICANT CHANGE UP (ref 5–17)
BLD GP AB SCN SERPL QL: NEGATIVE — SIGNIFICANT CHANGE UP
BUN SERPL-MCNC: 5 MG/DL — LOW (ref 7–23)
CALCIUM SERPL-MCNC: 9.5 MG/DL — SIGNIFICANT CHANGE UP (ref 8.4–10.5)
CHLORIDE SERPL-SCNC: 105 MMOL/L — SIGNIFICANT CHANGE UP (ref 96–108)
CO2 SERPL-SCNC: 27 MMOL/L — SIGNIFICANT CHANGE UP (ref 22–31)
CREAT SERPL-MCNC: 0.65 MG/DL — SIGNIFICANT CHANGE UP (ref 0.5–1.3)
EGFR: 129 ML/MIN/1.73M2 — SIGNIFICANT CHANGE UP
GLUCOSE SERPL-MCNC: 91 MG/DL — SIGNIFICANT CHANGE UP (ref 70–99)
HCG SERPL-ACNC: <0 MIU/ML — SIGNIFICANT CHANGE UP
HCT VFR BLD CALC: 37.2 % — SIGNIFICANT CHANGE UP (ref 34.5–45)
HGB BLD-MCNC: 12 G/DL — SIGNIFICANT CHANGE UP (ref 11.5–15.5)
MAGNESIUM SERPL-MCNC: 1.8 MG/DL — SIGNIFICANT CHANGE UP (ref 1.6–2.6)
MCHC RBC-ENTMCNC: 29 PG — SIGNIFICANT CHANGE UP (ref 27–34)
MCHC RBC-ENTMCNC: 32.3 GM/DL — SIGNIFICANT CHANGE UP (ref 32–36)
MCV RBC AUTO: 89.9 FL — SIGNIFICANT CHANGE UP (ref 80–100)
NRBC # BLD: 0 /100 WBCS — SIGNIFICANT CHANGE UP (ref 0–0)
PHOSPHATE SERPL-MCNC: 3.9 MG/DL — SIGNIFICANT CHANGE UP (ref 2.5–4.5)
PLATELET # BLD AUTO: 357 K/UL — SIGNIFICANT CHANGE UP (ref 150–400)
POTASSIUM SERPL-MCNC: 4.8 MMOL/L — SIGNIFICANT CHANGE UP (ref 3.5–5.3)
POTASSIUM SERPL-SCNC: 4.8 MMOL/L — SIGNIFICANT CHANGE UP (ref 3.5–5.3)
RBC # BLD: 4.14 M/UL — SIGNIFICANT CHANGE UP (ref 3.8–5.2)
RBC # FLD: 12.5 % — SIGNIFICANT CHANGE UP (ref 10.3–14.5)
RH IG SCN BLD-IMP: POSITIVE — SIGNIFICANT CHANGE UP
SODIUM SERPL-SCNC: 139 MMOL/L — SIGNIFICANT CHANGE UP (ref 135–145)
WBC # BLD: 6.59 K/UL — SIGNIFICANT CHANGE UP (ref 3.8–10.5)
WBC # FLD AUTO: 6.59 K/UL — SIGNIFICANT CHANGE UP (ref 3.8–10.5)

## 2023-08-25 PROCEDURE — 87070 CULTURE OTHR SPECIMN AEROBIC: CPT

## 2023-08-25 PROCEDURE — 85730 THROMBOPLASTIN TIME PARTIAL: CPT

## 2023-08-25 PROCEDURE — 87075 CULTR BACTERIA EXCEPT BLOOD: CPT

## 2023-08-25 PROCEDURE — 85025 COMPLETE CBC W/AUTO DIFF WBC: CPT

## 2023-08-25 PROCEDURE — 86900 BLOOD TYPING SEROLOGIC ABO: CPT

## 2023-08-25 PROCEDURE — 99285 EMERGENCY DEPT VISIT HI MDM: CPT

## 2023-08-25 PROCEDURE — 96361 HYDRATE IV INFUSION ADD-ON: CPT

## 2023-08-25 PROCEDURE — 85610 PROTHROMBIN TIME: CPT

## 2023-08-25 PROCEDURE — 83735 ASSAY OF MAGNESIUM: CPT

## 2023-08-25 PROCEDURE — 87184 SC STD DISK METHOD PER PLATE: CPT

## 2023-08-25 PROCEDURE — 86850 RBC ANTIBODY SCREEN: CPT

## 2023-08-25 PROCEDURE — 70491 CT SOFT TISSUE NECK W/DYE: CPT | Mod: MA

## 2023-08-25 PROCEDURE — 87205 SMEAR GRAM STAIN: CPT

## 2023-08-25 PROCEDURE — 85027 COMPLETE CBC AUTOMATED: CPT

## 2023-08-25 PROCEDURE — 10160 PNXR ASPIR ABSC HMTMA BULLA: CPT

## 2023-08-25 PROCEDURE — 84702 CHORIONIC GONADOTROPIN TEST: CPT

## 2023-08-25 PROCEDURE — 99232 SBSQ HOSP IP/OBS MODERATE 35: CPT

## 2023-08-25 PROCEDURE — 80048 BASIC METABOLIC PNL TOTAL CA: CPT

## 2023-08-25 PROCEDURE — 86901 BLOOD TYPING SEROLOGIC RH(D): CPT

## 2023-08-25 PROCEDURE — 36415 COLL VENOUS BLD VENIPUNCTURE: CPT

## 2023-08-25 PROCEDURE — 81025 URINE PREGNANCY TEST: CPT

## 2023-08-25 PROCEDURE — 84100 ASSAY OF PHOSPHORUS: CPT

## 2023-08-25 PROCEDURE — 96374 THER/PROPH/DIAG INJ IV PUSH: CPT

## 2023-08-25 PROCEDURE — 76942 ECHO GUIDE FOR BIOPSY: CPT

## 2023-08-25 RX ORDER — ACETAMINOPHEN 500 MG
2 TABLET ORAL
Qty: 0 | Refills: 0 | DISCHARGE
Start: 2023-08-25

## 2023-08-25 RX ORDER — IBUPROFEN 200 MG
1 TABLET ORAL
Qty: 0 | Refills: 0 | DISCHARGE
Start: 2023-08-25

## 2023-08-25 RX ORDER — SERTRALINE 25 MG/1
5 TABLET, FILM COATED ORAL
Qty: 0 | Refills: 0 | DISCHARGE
Start: 2023-08-25

## 2023-08-25 RX ADMIN — AMPICILLIN SODIUM AND SULBACTAM SODIUM 200 GRAM(S): 250; 125 INJECTION, POWDER, FOR SUSPENSION INTRAMUSCULAR; INTRAVENOUS at 03:18

## 2023-08-25 NOTE — PROGRESS NOTE ADULT - SUBJECTIVE AND OBJECTIVE BOX
OTOLARYNGOLOGY (ENT) PROGRESS NOTE    PATIENT: LUCIANO TAYLOR  MRN: [id number]  : [birth year]  [id number]  DATE OF SERVICE:  23  			         Subjective/ Interval:   ; patient seen this morning doing well, pending ID consult and IR recommendations. Afebrile   : patient seen, expresses wishes to go home plant o dc on Augmentin pain controlled pending final cultures     ALLERGIES:  No Known Allergies      MEDICATIONS:  Antiinfectives:   ampicillin/sulbactam  IVPB      ampicillin/sulbactam  IVPB 3 Gram(s) IV Intermittent every 6 hours    IV fluids:    Hematologic/Anticoagulation:    Pain medications/Neuro:  acetaminophen     Tablet .. 650 milliGRAM(s) Oral every 6 hours PRN  acetaminophen   IVPB .. 1000 milliGRAM(s) IV Intermittent once  ibuprofen  Tablet. 400 milliGRAM(s) Oral every 6 hours PRN  melatonin 3 milliGRAM(s) Oral at bedtime PRN  ondansetron Injectable 4 milliGRAM(s) IV Push every 4 hours PRN  oxyCODONE    IR 5 milliGRAM(s) Oral every 4 hours PRN  sertraline 125 milliGRAM(s) Oral daily    Endocrine Medications:     All other standing medications:     All other PRN medications:  benzocaine/menthol Lozenge 1 Lozenge Oral every 2 hours PRN  pantoprazole   Suspension 40 milliGRAM(s) Oral daily PRN    Vital Signs Last 24 Hrs  T(C): 36.7 (25 Aug 2023 06:45), Max: 37.1 (24 Aug 2023 20:15)  T(F): 98 (25 Aug 2023 06:45), Max: 98.7 (24 Aug 2023 20:15)  HR: 67 (25 Aug 2023 06:45) (62 - 70)  BP: 131/73 (25 Aug 2023 06:45) (105/68 - 131/73)  BP(mean): 80 (24 Aug 2023 16:42) (80 - 80)  RR: 18 (25 Aug 2023 06:45) (17 - 18)  SpO2: 100% (25 Aug 2023 06:45) (94% - 100%)    Parameters below as of 24 Aug 2023 20:15  Patient On (Oxygen Delivery Method): room air           @ 07:01  -   @ 07:00  --------------------------------------------------------  IN:  Total IN: 0 mL    OUT:    Oral Fluid: 0 mL    Voided (mL): 500 mL  Total OUT: 500 mL    Total NET: -500 mL      PHYSICAL EXAM:  GEN: appears stated age, NAD  Respiratory: No respiratory distress, stridor, or stertor  Voice quality: normal  Face:  Symmetric, facial movements intact.   OU: EOMI  AD: Pinna wnl  AS: Pinna wnl  Nose: nasal cavity clear bilaterally, inferior turbinates normal, mucosa normal without crusting or bleeding  OC/OP: tongue normal, floor of mouth WNL, no masses or lesions, OP clear  Neck: Small ~1.5 cm area of erythema with tenderness to submental region just above the midline of hyoid on the right side. gauze and tape over are post needle aspiration from IR,  No significant induration, mild swelling.   Neuro: CNII-XII intact         LABS                       12.0   6.59  )-----------( 357      ( 25 Aug 2023 06:53 )             37.2        139  |  105  |  5<L>  ----------------------------<  91  4.8   |  27  |  0.65    Ca    9.5      25 Aug 2023 06:53  Phos  3.9       Mg     1.8                Coagulation Studies-   PT/INR - ( 24 Aug 2023 11:53 )   PT: 11.9 sec;   INR: 1.05          PTT - ( 24 Aug 2023 11:53 )  PTT:35.3 sec  Urinalysis Basic - ( 25 Aug 2023 06:53 )    Color: x / Appearance: x / SG: x / pH: x  Gluc: 91 mg/dL / Ketone: x  / Bili: x / Urobili: x   Blood: x / Protein: x / Nitrite: x   Leuk Esterase: x / RBC: x / WBC x   Sq Epi: x / Non Sq Epi: x / Bacteria: x      Endocrine Panel-  Calcium: 9.5 mg/dL ( @ 06:53)      Culture - Body Fluid with Gram Stain (collected 23 @ 15:50)  Source: .Body Fluid Right Submental Collection  Gram Stain (23 @ 20:18):    No organisms seen    Rare WBC's

## 2023-08-25 NOTE — DISCHARGE NOTE NURSING/CASE MANAGEMENT/SOCIAL WORK - [id number]_ALL_CORE
For information on Fall & Injury Prevention, visit: [email or web address] OR  [email or web address] OR  https://www.cdc.gov/steadi/patient.html

## 2023-08-25 NOTE — DISCHARGE NOTE PROVIDER - HOSPITAL COURSE
OTOLARYNGOLOGY (ENT) DISCHARGE SUMMARY    PATIENT: LUCIANO TAYLOR               : [birth year]  MRN: [id number]  ADMISSION DATE: 23  Discharge Date: 23 @ 07:22  Attending Physician: Kiel Bland    Admission Diagnosis:  NECK PAIN:      HPI:LUCIANO TAYLOR  is a 22F with history of prior thyroglossal duct cyst excision by ENT at [facility]  who presents from ENT office for evaluation of swelling and tenderness under her chin x 3 days as well as discomfort with swallowing. Patient was started on Augmentin for possible infected cyst 3 days ago by PCP. Reports that she feels as if  the swelling and tenderness that developed under the chin is about the same since starting antibiotics. Has been taking tylenol and ibuprofen at home with intermittent improvement in pain but continues to recur. Has been tolerating diet including solids and liquids with some discomfort. No choking or coughing with eating. Denies any voice change or difficulty breathing. Reports this is the third episode of similar symptoms since the surgery to remove the cyst and she had an appointment with Dr. Bland for a second opinion/reevaluation. ENT following for possible seroma vs abscess vs cyst remnant. IR consulted for aspiration of collection. Will send home with oral Augmentin today     Disposition: Home with family.    Discharge Condition: Stable

## 2023-08-25 NOTE — DISCHARGE NOTE PROVIDER - NSDCMRMEDTOKEN_GEN_ALL_CORE_FT
acetaminophen 325 mg oral tablet: 2 tab(s) orally every 6 hours As needed Mild Pain (1 - 3)  Augmentin 500 mg-125 mg oral tablet: 1 tab(s) orally 2 times a day  ibuprofen 400 mg oral tablet: 1 tab(s) orally every 6 hours As needed Moderate Pain (4 - 6)  sertraline 25 mg oral tablet: 5 tab(s) orally once a day

## 2023-08-25 NOTE — DISCHARGE NOTE NURSING/CASE MANAGEMENT/SOCIAL WORK - PATIENT PORTAL LINK FT
You can access the FollowMyHealth Patient Portal offered by [facility] by registering at the following website: [email or web address]. By joining [facility]’s FollowMyHealth portal, you will also be able to view your health information using other applications (apps) compatible with our system.

## 2023-08-25 NOTE — DISCHARGE NOTE PROVIDER - NSDCFUADDINST_GEN_ALL_CORE_FT
ENT Discharge Instructions    ENT follow up appointment:  - please call the office to confirm appointment:     *Please call your doctor or nurse practitioner if you have increased pain, swelling, redness, or drainage from the incision site.  *Avoid swimming until your follow-up appointment.  *You may shower, and wash surgical incisions with a mild soap and warm water. Gently pat the area dry.    Activity:  - fatigue is common after surgery, rest if you feel tired   -Please get plenty of rest, continue to ambulate several times per day, and drink adequate amounts of fluids.   - Walking is recommended, ambulate as tolerated      Pain Expectations:  - Please take tylenol and motrin for pain     Medications: Please resume all regular home medications unless specifically advised not to take a particular medication. Also, please take any new medications as prescribed.     Warning Signs:  Please call your doctor or nurse practitioner if you experience the following:  *You experience new chest pain, pressure, squeezing or tightness.  *New or worsening cough, shortness of breath, or wheeze.  *If you are vomiting and cannot keep down fluids or your medications.  *You are getting dehydrated due to continued vomiting, diarrhea, or other reasons. Signs of dehydration include dry mouth, rapid heartbeat, or feeling dizzy or faint when standing.  *Your pain is not improving within 8-12 hours or is not gone within 24 hours.  *You have shaking chills, or fever greater than 101.5 degrees Fahrenheit or 38 degrees Celsius.  *Any change in your symptoms, or any new symptoms that concern you.     PLEASE CALL THE OFFICE WITH ANY QUESTIONS OR CONCERNS:

## 2023-08-25 NOTE — PROGRESS NOTE ADULT - ASSESSMENT
LUCIANO TAYLOR  is a 22y Female with history of prior thyroglossal duct cyst excision by ENT at [facility] 02/23 who presents today from ENT office for evaluation of swelling and tenderness under her chin that developed 3 days ago as well as discomfort with swallowing. Patient was started on Augmentin for possible infected cyst 3 days ago by PCP. Reports that she feels as if  the swelling and tenderness that developed under the chin is about the same since starting antibioitics. Has been taking tylenol and ibuprofen at home with intermittent improvement in pain but continues to recur. Has been tolerating diet including solids and liquids with some discomfort. No choking or coughing with eating. Denies any voice change or difficulty breathing. Reports this is the third episode of similar symptoms since the surgery to remove the cyst and she had an appointment with Dr. Bland today for a second opinion/reevaluation.     Plan:  -s/p neede aspiration from IR- plan to follow cultures and DC home on Augmentin   - f/u with Dr. Bland outpatient   [facility] at [phone] with any questions/concerns.    Jenae Mckee PA-C  08-25-23 @ 07:50
#Thyroglossal duct cyst    Amanda Garcia is a 23 yo woman with PMHx of thyroglossal duct cyst excision (02/23) who presents with thyroglossal duct vs seroma infection. Her symptoms did not improve on oral Augmentin but are now improving while inpatient on IV Unasyn. Afebrile on admission without leukocytosis. CT consistent with infected seroma vs. residual thyroglossal duct cyst. Now s/p IR guided aspiration of cyst, on Unasyn 3g IV q6h with cultures pending.       Recommendations:  *INCOMPLETE NOTE*
infected remnant TGDC. Some improvement with Abx. Will require US guided aspiration of fluid collection for culture data. Discussed with the patient and mother at bedside the need to treat the acute episode of aspiration and a course of abx prior to considering operative re-resection of the remnant cyst in the near future. The earliest she can possibly be discharged is tomorrow if we have culture data.     Discussed with Dr Bland.     Ruma Conley MD (ENT fellow)
    Assessment and Plan:  LUCIANO TAYLOR  is a 22y Female with history of prior thyroglossal duct cyst excision by ENT at [facility] 02/23 who presents today from ENT office for evaluation of swelling and tenderness under her chin that developed 3 days ago as well as discomfort with swallowing. Patient was started on Augmentin for possible infected cyst 3 days ago by PCP. Reports that she feels as if  the swelling and tenderness that developed under the chin is about the same since starting antibioitics. Has been taking tylenol and ibuprofen at home with intermittent improvement in pain but continues to recur. Has been tolerating diet including solids and liquids with some discomfort. No choking or coughing with eating. Denies any voice change or difficulty breathing. Reports this is the third episode of similar symptoms since the surgery to remove the cyst and she had an appointment with Dr. Bland today for a second opinion/reevaluation.       PLAN:  - NPO pending IR   - Pending coags and urine pregnancy   - IR consult for needle  - f/u ID recs-  continue unasyn     [facility] at [phone] with any questions/concerns.    Jenae Mckee PA-C  08-24-23 @ 08:57

## 2023-08-25 NOTE — PROGRESS NOTE ADULT - SUBJECTIVE AND OBJECTIVE BOX
INFECTIOUS DISEASES CONSULT FOLLOW-UP NOTE    INTERVAL HPI/OVERNIGHT EVENTS:      ROS:   Constitutional, eyes, ENT, cardiovascular, respiratory, gastrointestinal, genitourinary, integumentary, neurological, psychiatric and heme/lymph are otherwise negative other than noted above       ANTIBIOTICS/RELEVANT:    MEDICATIONS  (STANDING):  acetaminophen   IVPB .. 1000 milliGRAM(s) IV Intermittent once  ampicillin/sulbactam  IVPB 3 Gram(s) IV Intermittent every 6 hours  ampicillin/sulbactam  IVPB      sertraline 125 milliGRAM(s) Oral daily    MEDICATIONS  (PRN):  acetaminophen     Tablet .. 650 milliGRAM(s) Oral every 6 hours PRN Mild Pain (1 - 3)  benzocaine/menthol Lozenge 1 Lozenge Oral every 2 hours PRN Sore Throat  ibuprofen  Tablet. 400 milliGRAM(s) Oral every 6 hours PRN Moderate Pain (4 - 6)  melatonin 3 milliGRAM(s) Oral at bedtime PRN Sleep  ondansetron Injectable 4 milliGRAM(s) IV Push every 4 hours PRN Nausea and/or Vomiting  oxyCODONE    IR 5 milliGRAM(s) Oral every 4 hours PRN Severe Pain (7 - 10)  pantoprazole   Suspension 40 milliGRAM(s) Oral daily PRN heartburn        Vital Signs Last 24 Hrs  T(C): 36.7 (25 Aug 2023 06:45), Max: 37.1 (24 Aug 2023 20:15)  T(F): 98 (25 Aug 2023 06:45), Max: 98.7 (24 Aug 2023 20:15)  HR: 67 (25 Aug 2023 06:45) (62 - 70)  BP: 131/73 (25 Aug 2023 06:45) (105/68 - 131/73)  BP(mean): 80 (24 Aug 2023 16:42) (80 - 80)  RR: 18 (25 Aug 2023 06:45) (17 - 18)  SpO2: 100% (25 Aug 2023 06:45) (94% - 100%)    Parameters below as of 24 Aug 2023 20:15  Patient On (Oxygen Delivery Method): room air        08-24-23 @ 07:01  -  08-25-23 @ 07:00  --------------------------------------------------------  IN: 0 mL / OUT: 500 mL / NET: -500 mL    PHYSICAL EXAM:  Constitutional: alert, NAD  ENT: no oral lesions or masses  Neck: area of tenderness and fluctuance in the right submental area with small overlying erythema; No cervical lymphadenopathy or tenderness  Pulmonary: no respiratory distress, CTA bilaterally  Heart: rrr, normal S1 and S2  Abdomen: normal bowel sounds, soft, non-tender      LABS:                        12.0   6.59  )-----------( 357      ( 25 Aug 2023 06:53 )             37.2     08-25    139  |  105  |  5<L>  ----------------------------<  91  4.8   |  27  |  0.65    Ca    9.5      25 Aug 2023 06:53  Phos  3.9     08-25  Mg     1.8     08-25      PT/INR - ( 24 Aug 2023 11:53 )   PT: 11.9 sec;   INR: 1.05          PTT - ( 24 Aug 2023 11:53 )  PTT:35.3 sec  Urinalysis Basic - ( 25 Aug 2023 06:53 )    Color: x / Appearance: x / SG: x / pH: x  Gluc: 91 mg/dL / Ketone: x  / Bili: x / Urobili: x   Blood: x / Protein: x / Nitrite: x   Leuk Esterase: x / RBC: x / WBC x   Sq Epi: x / Non Sq Epi: x / Bacteria: x        MICROBIOLOGY:      RADIOLOGY & ADDITIONAL STUDIES:  Reviewed

## 2023-08-25 NOTE — DISCHARGE NOTE PROVIDER - CARE PROVIDER_API CALL
Kiel Bland  Otolaryngology  [address] [place] [address], [address] [place] [address]  Phone: [phone]  Fax: [phone]  Follow Up Time:

## 2023-08-30 LAB
-  AMPICILLIN/SULBACTAM: SIGNIFICANT CHANGE UP
-  AMPICILLIN: SIGNIFICANT CHANGE UP
-  CEFTRIAXONE: SIGNIFICANT CHANGE UP
-  CIPROFLOXACIN: SIGNIFICANT CHANGE UP
-  CLARITHROMYCIN: SIGNIFICANT CHANGE UP
-  LEVOFLOXACIN: SIGNIFICANT CHANGE UP
-  TRIMETHOPRIM/SULFAMETHOXAZOLE: SIGNIFICANT CHANGE UP
CULTURE RESULTS: SIGNIFICANT CHANGE UP
METHOD TYPE: SIGNIFICANT CHANGE UP
ORGANISM # SPEC MICROSCOPIC CNT: SIGNIFICANT CHANGE UP
ORGANISM # SPEC MICROSCOPIC CNT: SIGNIFICANT CHANGE UP
SPECIMEN SOURCE: SIGNIFICANT CHANGE UP

## 2023-09-01 DIAGNOSIS — R07.0 PAIN IN THROAT: ICD-10-CM

## 2023-09-01 DIAGNOSIS — Q89.2 CONGENITAL MALFORMATIONS OF OTHER ENDOCRINE GLANDS: ICD-10-CM

## 2023-09-14 ENCOUNTER — TRANSCRIPTION ENCOUNTER (OUTPATIENT)
Age: 23
End: 2023-09-14

## 2023-09-15 ENCOUNTER — APPOINTMENT (OUTPATIENT)
Dept: MRI IMAGING | Facility: CLINIC | Age: 23
End: 2023-09-15
Payer: COMMERCIAL

## 2023-09-15 ENCOUNTER — OUTPATIENT (OUTPATIENT)
Dept: OUTPATIENT SERVICES | Facility: HOSPITAL | Age: 23
LOS: 1 days | End: 2023-09-15

## 2023-09-15 PROBLEM — Z00.00 ENCOUNTER FOR PREVENTIVE HEALTH EXAMINATION: Status: ACTIVE | Noted: 2023-09-15

## 2023-09-15 PROCEDURE — 70543 MRI ORBT/FAC/NCK W/O &W/DYE: CPT | Mod: 26
